# Patient Record
Sex: FEMALE | Race: WHITE | NOT HISPANIC OR LATINO
[De-identification: names, ages, dates, MRNs, and addresses within clinical notes are randomized per-mention and may not be internally consistent; named-entity substitution may affect disease eponyms.]

---
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
## 2019-08-05 ENCOUNTER — EMERGENCY (EMERGENCY)
Facility: HOSPITAL | Age: 19
LOS: 1 days | Discharge: HOME | End: 2019-08-05
Attending: INTERNAL MEDICINE | Admitting: INTERNAL MEDICINE
Payer: COMMERCIAL

## 2019-08-05 VITALS
RESPIRATION RATE: 16 BRPM | TEMPERATURE: 98 F | DIASTOLIC BLOOD PRESSURE: 87 MMHG | OXYGEN SATURATION: 100 % | HEART RATE: 88 BPM | SYSTOLIC BLOOD PRESSURE: 134 MMHG

## 2019-08-05 LAB
ALBUMIN SERPL ELPH-MCNC: 4.8 G/DL — SIGNIFICANT CHANGE UP (ref 3.3–5)
ALP SERPL-CCNC: 57 U/L — SIGNIFICANT CHANGE UP (ref 40–120)
ALT FLD-CCNC: 12 U/L — SIGNIFICANT CHANGE UP (ref 4–33)
ANION GAP SERPL CALC-SCNC: 17 MMO/L — HIGH (ref 7–14)
APPEARANCE UR: CLEAR — SIGNIFICANT CHANGE UP
AST SERPL-CCNC: 19 U/L — SIGNIFICANT CHANGE UP (ref 4–32)
BACTERIA # UR AUTO: SIGNIFICANT CHANGE UP
BASOPHILS # BLD AUTO: 0.03 K/UL — SIGNIFICANT CHANGE UP (ref 0–0.2)
BASOPHILS NFR BLD AUTO: 0.5 % — SIGNIFICANT CHANGE UP (ref 0–2)
BILIRUB SERPL-MCNC: 0.5 MG/DL — SIGNIFICANT CHANGE UP (ref 0.2–1.2)
BILIRUB UR-MCNC: NEGATIVE — SIGNIFICANT CHANGE UP
BLOOD UR QL VISUAL: SIGNIFICANT CHANGE UP
BUN SERPL-MCNC: 6 MG/DL — LOW (ref 7–23)
CALCIUM SERPL-MCNC: 10.2 MG/DL — SIGNIFICANT CHANGE UP (ref 8.4–10.5)
CHLORIDE SERPL-SCNC: 102 MMOL/L — SIGNIFICANT CHANGE UP (ref 98–107)
CO2 SERPL-SCNC: 20 MMOL/L — LOW (ref 22–31)
COLOR SPEC: SIGNIFICANT CHANGE UP
CREAT SERPL-MCNC: 0.55 MG/DL — SIGNIFICANT CHANGE UP (ref 0.5–1.3)
EOSINOPHIL # BLD AUTO: 0.06 K/UL — SIGNIFICANT CHANGE UP (ref 0–0.5)
EOSINOPHIL NFR BLD AUTO: 1 % — SIGNIFICANT CHANGE UP (ref 0–6)
EPI CELLS # UR: SIGNIFICANT CHANGE UP
GLUCOSE SERPL-MCNC: 88 MG/DL — SIGNIFICANT CHANGE UP (ref 70–99)
GLUCOSE UR-MCNC: NEGATIVE — SIGNIFICANT CHANGE UP
HCT VFR BLD CALC: 42.2 % — SIGNIFICANT CHANGE UP (ref 34.5–45)
HGB BLD-MCNC: 14.2 G/DL — SIGNIFICANT CHANGE UP (ref 11.5–15.5)
IMM GRANULOCYTES NFR BLD AUTO: 0.3 % — SIGNIFICANT CHANGE UP (ref 0–1.5)
KETONES UR-MCNC: 20 — SIGNIFICANT CHANGE UP
LEUKOCYTE ESTERASE UR-ACNC: SIGNIFICANT CHANGE UP
LIDOCAIN IGE QN: 31.9 U/L — SIGNIFICANT CHANGE UP (ref 7–60)
LYMPHOCYTES # BLD AUTO: 1.8 K/UL — SIGNIFICANT CHANGE UP (ref 1–3.3)
LYMPHOCYTES # BLD AUTO: 29.6 % — SIGNIFICANT CHANGE UP (ref 13–44)
MCHC RBC-ENTMCNC: 29.2 PG — SIGNIFICANT CHANGE UP (ref 27–34)
MCHC RBC-ENTMCNC: 33.6 % — SIGNIFICANT CHANGE UP (ref 32–36)
MCV RBC AUTO: 86.8 FL — SIGNIFICANT CHANGE UP (ref 80–100)
MONOCYTES # BLD AUTO: 0.48 K/UL — SIGNIFICANT CHANGE UP (ref 0–0.9)
MONOCYTES NFR BLD AUTO: 7.9 % — SIGNIFICANT CHANGE UP (ref 2–14)
MUCOUS THREADS # UR AUTO: SIGNIFICANT CHANGE UP
NEUTROPHILS # BLD AUTO: 3.7 K/UL — SIGNIFICANT CHANGE UP (ref 1.8–7.4)
NEUTROPHILS NFR BLD AUTO: 60.7 % — SIGNIFICANT CHANGE UP (ref 43–77)
NITRITE UR-MCNC: NEGATIVE — SIGNIFICANT CHANGE UP
NRBC # FLD: 0 K/UL — SIGNIFICANT CHANGE UP (ref 0–0)
PH UR: 6 — SIGNIFICANT CHANGE UP (ref 5–8)
PLATELET # BLD AUTO: 251 K/UL — SIGNIFICANT CHANGE UP (ref 150–400)
PMV BLD: 11.3 FL — SIGNIFICANT CHANGE UP (ref 7–13)
POTASSIUM SERPL-MCNC: 3.6 MMOL/L — SIGNIFICANT CHANGE UP (ref 3.5–5.3)
POTASSIUM SERPL-SCNC: 3.6 MMOL/L — SIGNIFICANT CHANGE UP (ref 3.5–5.3)
PROT SERPL-MCNC: 8 G/DL — SIGNIFICANT CHANGE UP (ref 6–8.3)
PROT UR-MCNC: 20 — SIGNIFICANT CHANGE UP
RBC # BLD: 4.86 M/UL — SIGNIFICANT CHANGE UP (ref 3.8–5.2)
RBC # FLD: 12.3 % — SIGNIFICANT CHANGE UP (ref 10.3–14.5)
RBC CASTS # UR COMP ASSIST: HIGH (ref 0–?)
SODIUM SERPL-SCNC: 139 MMOL/L — SIGNIFICANT CHANGE UP (ref 135–145)
SP GR SPEC: 1.01 — SIGNIFICANT CHANGE UP (ref 1–1.04)
UROBILINOGEN FLD QL: NORMAL — SIGNIFICANT CHANGE UP
WBC # BLD: 6.09 K/UL — SIGNIFICANT CHANGE UP (ref 3.8–10.5)
WBC # FLD AUTO: 6.09 K/UL — SIGNIFICANT CHANGE UP (ref 3.8–10.5)
WBC UR QL: HIGH (ref 0–?)

## 2019-08-05 PROCEDURE — 99284 EMERGENCY DEPT VISIT MOD MDM: CPT

## 2019-08-05 PROCEDURE — 76705 ECHO EXAM OF ABDOMEN: CPT | Mod: 26

## 2019-08-05 RX ORDER — SODIUM CHLORIDE 9 MG/ML
1000 INJECTION INTRAMUSCULAR; INTRAVENOUS; SUBCUTANEOUS ONCE
Refills: 0 | Status: COMPLETED | OUTPATIENT
Start: 2019-08-05 | End: 2019-08-05

## 2019-08-05 RX ORDER — METOCLOPRAMIDE HCL 10 MG
10 TABLET ORAL ONCE
Refills: 0 | Status: COMPLETED | OUTPATIENT
Start: 2019-08-05 | End: 2019-08-05

## 2019-08-05 RX ADMIN — Medication 10 MILLIGRAM(S): at 23:15

## 2019-08-05 RX ADMIN — SODIUM CHLORIDE 1000 MILLILITER(S): 9 INJECTION INTRAMUSCULAR; INTRAVENOUS; SUBCUTANEOUS at 22:36

## 2019-08-05 NOTE — ED PROVIDER NOTE - ATTENDING CONTRIBUTION TO CARE
TRI Stern MD performed a history and physical exam of the patient and discussed their management with the resident and /or advanced care provider. I reviewed the resident and /or ACP's note and agree with the documented findings and plan of care. My medical decision making and observations are found above.    Awake and alert, conversant.  Unremarkable cardiac and pulmonary exams.  Abdomen soft and nontender, no rebound/guarding.  No Muller's sign.  No RLQ tenderness and without Rovsing's, obturator or aye signs.  Ambulating well.

## 2019-08-05 NOTE — ED ADULT TRIAGE NOTE - CHIEF COMPLAINT QUOTE
pt comes to ED for abdominal pain since friday with N & D pt was supposed to get her period on friday . pt VSS NAD

## 2019-08-05 NOTE — ED ADULT NURSE NOTE - NSIMPLEMENTINTERV_GEN_ALL_ED
Implemented All Universal Safety Interventions:  [place] to call system. Call bell, personal items and telephone within reach. Instruct patient to call for assistance. Room bathroom lighting operational. Non-slip footwear when patient is off stretcher. Physically safe environment: no spills, clutter or unnecessary equipment. Stretcher in lowest position, wheels locked, appropriate side rails in place.

## 2019-08-05 NOTE — ED PROVIDER NOTE - CLINICAL SUMMARY MEDICAL DECISION MAKING FREE TEXT BOX
18F PMH juvenile arthritis presents to the ED with crampy abdominal pain and diarrhea since Friday. Patient saw her doctor who elicited RLQ tenderness. Also has some lightheadedness and a headache. Denies fevers, chills, blood in the stool, chest pain, SOB. No abdominal surgeries, no sick contacts, still passing gas, LMP started today (expected to arrive on Friday). Vitals on arrival are reassuring. Exam pertinent for a non-tender and non-distended abdomen. Gastroenteritis vs. Appendicitis vs. Gyn. CBC, CMP, Lipase, UA, Upreg, fluids, reglan, Urine GC, gyn exam, RLQ appendix ultrasound. Reassess. 18F PMH juvenile arthritis presents to the ED with crampy abdominal pain and diarrhea since Friday. Patient saw her doctor who elicited RLQ tenderness. Also has some lightheadedness and a headache. Denies fevers, chills, blood in the stool, chest pain, SOB. No abdominal surgeries, no sick contacts, still passing gas, LMP started today (expected to arrive on Friday). Vitals on arrival are reassuring. Exam pertinent for a non-tender and non-distended abdomen. Gastroenteritis vs. Appendicitis vs. Gyn. CBC, CMP, Lipase, UA, Upreg, fluids, reglan, Urine GC, gyn exam, RLQ appendix ultrasound. Reassess.    Dr. Stern: I agree with the above provided history and exam and modify it as follows.  18 F denies PMH P/W abdominal pain and diarrhea.  No fever or recent travel.  No nausea/vomiting.  Benign abdominal exam.  Plan for labs, ultraosund ot evaluate for appendicitis although pre-test probability is low given her benign exam, lack of fever and nausea.

## 2019-08-05 NOTE — ED PROVIDER NOTE - NS ED ROS FT
GENERAL: no fever, chills  HEENT: no cough, congestion, odynophagia, dysphagia  CARDIAC: + lightheadedness after diarrhea, no chest pain, palpitations  PULM: no dyspnea, wheezing   GI: + crampy abdominal pain,  + non-bloody diarrhea, no nausea, vomiting, constipation, melena, hematochezia  : no urinary dysuria, frequency, incontinence, hematuria  NEURO: + headache, no motor weakness, sensory changes  MSK: no joint or muscle pain  SKIN: no rashes, hives  HEME: no active bleeding, bruising

## 2019-08-05 NOTE — ED PROVIDER NOTE - PROGRESS NOTE DETAILS
Offered Gynecological exam to patient but she refused. Ultrasound does not demonstrate appendicitis.  Pt's repeat abdominal exam remains soft and nontender.  Ambulating about the ED.  Risks/benefits of Ct discussed and the pt agrees that given her age and the increased risk of radiation it is best to refrain from CT at this time. She is aware that some appendicitis can be missed in this way and agrees to return to the ED promptly in the event that her symptoms recur, if pain localizes to the right lower quadrant, or if she develops fever/emesis.  tolerates PO well and agrees to follow up with her primary medical doctor.  I called her PMD to make them aware of this plan and left a message.  Patient discharged after return precautions and follow-up plan were discussed.

## 2019-08-05 NOTE — ED PROVIDER NOTE - NSFOLLOWUPINSTRUCTIONS_ED_ALL_ED_FT
Please follow up with your primary medical doctor within two days.  Please return to the emergency department immediately if your pain returns or moves to the right lower quadrant of your abdomen , if you develop fever or chills, nausea or vomiting.

## 2019-08-05 NOTE — ED ADULT NURSE NOTE - OBJECTIVE STATEMENT
Received pt in intake room 2. Pt A&OX3, ambulatory. Pt comes to ED for abdominal pain since Friday with N & D. Pt also c/o migraine. Pt reports was suppose to get her period on Friday. Pt appears stable, comfortable and in no apparent distress. respirations are equal, nonlabored, no respiratory distress noted. Denies any medical hx. Denies any other medical complaints. denies chest pain, sob, vomiting, fever, cough, urinary complications. 20 gauge IV placed in the left AC, labs sent. pt stable, family at bedside. pt awaiting ultrasound.

## 2019-08-05 NOTE — ED PROVIDER NOTE - OBJECTIVE STATEMENT
Patient is a 18 year old female with PMH of Juvenile Arthritis presents with "squeezing" abdominal pain and diarrhea since Friday. She states the pain feels like intermittent cramps. Patient states she can't eat because anything she eats goes right through her. She states she has about 5 episodes of diarrhea per day that are non-bloody and not dark. Tried pepto Bismal which didn't help. Patient also endorses some lightheadedness and a headache which she says is similar to her migraines. Patient was told to come to the ED because her doctor elicited RLQ pain, but she states at this time she doesn't have abdominal pain. Patient still passing gas, and no abdominal surgeries in the past. No sick contacts, no recent travel. LMP started today (which she was expecting to start on Friday). Denies fevers, chest pain, SOB, nausea, vomiting, dysuria, hematuria, leg swelling.

## 2019-08-05 NOTE — ED PROVIDER NOTE - PHYSICAL EXAMINATION
GENERAL: appears mildly uncomfortable in bed  HEENT: normal conjunctiva, oral mucosa moist  CARDIAC: regular rate and rhythm, normal S1 and S2,  no appreciable murmurs, capillary refill <3 sec  PULM: clear to ascultation bilaterally, no crackles, rales, rhonchi, or wheezing  GI: abdomen nontender in any quadrant, non-distended, soft, no guarding or rebound tenderness, positive bowel sounds in all four quadrants  NEURO: alert and oriented x 3, normal speech, no gross focal motor or sensory deficits, nonantalgic gait  MSK: no visible deformities, no peripheral edema, calf tenderness/redness/swelling  SKIN: no visible rashes, dry, well-perfused  PSYCH: appropriate mood and affect